# Patient Record
Sex: MALE
[De-identification: names, ages, dates, MRNs, and addresses within clinical notes are randomized per-mention and may not be internally consistent; named-entity substitution may affect disease eponyms.]

---

## 2018-01-23 NOTE — XRAY REPORT
Left knee 3 views.



History: Knee pain.



Findings: The joint space appears normal. Bone mineralization is normal 

for age. Numerous areas of vascular calcification are noted. No 

additional soft tissue abnormalities are seen. The patellofemoral joint 

is normal.



Impression: No significant findings.

## 2018-02-01 ENCOUNTER — HOSPITAL ENCOUNTER (OUTPATIENT)
Dept: HOSPITAL 5 - VAS | Age: 73
Discharge: HOME | End: 2018-02-01
Attending: INTERNAL MEDICINE
Payer: MEDICARE

## 2018-02-01 DIAGNOSIS — I82.4Z2: Primary | ICD-10-CM

## 2018-02-01 DIAGNOSIS — C25.9: ICD-10-CM

## 2018-02-01 DIAGNOSIS — Z79.01: ICD-10-CM

## 2018-02-02 NOTE — VASCULAR LAB REPORT
Left Lower Extremity Venous Duplex Study:



Reason for Exam: Swelling of the left lower extremity.



Comments on the Right:

A limited duplex study was done of the proximal veins of the right 

lower extremity. All veins visualized are freely compressible without 

evidence of internal echogenicity.  Flow is spontaneous and phasic 

throughout. No evidence of acute or chronic thrombus is seen in any of 

the vessels visualized.



Comments on the Left:

There is an acute occlusive deep venous thrombus in the left posterior 

tibial vein and peroneal veins.  The remaining veins visualized are 

freely compressible without evidence of internal echogenicity.  

Spontaneous and phasic flow is present proximally.



Impression:

Acute left lower extremity distal deep venous thrombus